# Patient Record
Sex: MALE | Race: BLACK OR AFRICAN AMERICAN | NOT HISPANIC OR LATINO | Employment: OTHER | URBAN - METROPOLITAN AREA
[De-identification: names, ages, dates, MRNs, and addresses within clinical notes are randomized per-mention and may not be internally consistent; named-entity substitution may affect disease eponyms.]

---

## 2024-06-13 ENCOUNTER — OFFICE VISIT (OUTPATIENT)
Dept: OPTOMETRY | Facility: CLINIC | Age: 64
End: 2024-06-13
Payer: COMMERCIAL

## 2024-06-13 DIAGNOSIS — H25.13 AGE-RELATED NUCLEAR CATARACT OF BOTH EYES: ICD-10-CM

## 2024-06-13 DIAGNOSIS — H11.003 PTERYGIUM OF BOTH EYES: Primary | ICD-10-CM

## 2024-06-13 PROCEDURE — 99203 OFFICE O/P NEW LOW 30 MIN: CPT | Performed by: OPTOMETRIST

## 2024-06-13 RX ORDER — NEOMYCIN SULFATE, POLYMYXIN B SULFATE AND DEXAMETHASONE 3.5; 10000; 1 MG/ML; [USP'U]/ML; MG/ML
SUSPENSION/ DROPS OPHTHALMIC
COMMUNITY
Start: 2024-04-29

## 2024-06-13 RX ORDER — DOXYCYCLINE HYCLATE 100 MG
TABLET ORAL
COMMUNITY
Start: 2024-05-25

## 2024-06-13 RX ORDER — AMLODIPINE BESYLATE 10 MG/1
1 TABLET ORAL DAILY
COMMUNITY
Start: 2023-09-05 | End: 2024-09-04

## 2024-06-13 ASSESSMENT — VISUAL ACUITY
METHOD: SNELLEN - LINEAR
OS_SC: 20/20
OS_SC+: -1
OD_SC+: -1
OD_SC: 20/25

## 2024-06-13 ASSESSMENT — EXTERNAL EXAM - LEFT EYE: OS_EXAM: NORMAL

## 2024-06-13 ASSESSMENT — EXTERNAL EXAM - RIGHT EYE: OD_EXAM: NORMAL

## 2024-06-13 ASSESSMENT — TONOMETRY
OD_IOP_MMHG: 14
OS_IOP_MMHG: 14
IOP_METHOD: TONOPEN

## 2024-06-13 ASSESSMENT — SLIT LAMP EXAM - LIDS
COMMENTS: NORMAL
COMMENTS: NORMAL

## 2024-06-13 NOTE — PATIENT INSTRUCTIONS
"A pterygium (qeh-SPW-ay-uhm) is an elevated, wedged-shaped bump on the eyeball that starts on the white of the eye (sclera) and can invade the cornea. If you have more than one of these eye growths, the plural form of the word is pterygia (mrr-LLY-wn-ah).    Though it's commonly called \"surfer's eye,\" you don't have to be a surfer or ever see the ocean to get a pterygium. But being in bright sunlight for long hours -- especially when you are on water, which reflects the sun's harmful UV rays -- increases your risk.    Pterygia are benign (non-cancerous) growths, but they can permanently disfigure the eye. They also can cause discomfort and blurry vision.    Causes  Although ultraviolet radiation from the sun appears to be the primary cause for the development and growth of pterygia, dust and wind are sometimes implicated too, as is dry eye disease.    Pterygia usually develop in 30- to 04-nmek-vkod, and these bumps on the eyeball rarely are seen in children. Having light skin and light eyes may put you at increased risk of getting a pterygium.    Signs And Symptoms  Pterygia usually occur on the side of the eye closer to the nose, but they can also develop on the side closer to the ear as well and can affect one eye or both eyes.    Many people with mild surfer's eye may not experience symptoms or require treatment. But large or growing pterygia often cause a gritty, itchy or burning sensation or the feeling something is \"in\" the eye (called a foreign body sensation). Also, these pterygia often become inflamed, causing unattractive red eyes.    If a pterygium significantly invades the cornea, it can distort the shape of the front surface of the eye, causing astigmatism and higher-order aberrations that affect vision.    Sometimes people confuse pterygia with eye growths called pingueculae, but they are different. Learn more about what a pinguecula is.    Pterygium Treatment  Treatment of surfer's eye depends on the " size of the pterygium, whether it is growing and the symptoms it causes. Regardless of severity, pterygia should be monitored to prevent scarring that could lead to vision loss.  Referred for corneal and cataract consult.    If a pterygium is small, your eye doctor may prescribe lubricants or a mild steroid eye drop to temporarily reduce swelling and redness. Contact lenses are sometimes used to cover the growth, protecting it from some of the effects of dryness or potentially from further UV exposure. Topical cyclosporine also may be prescribed for dry eye.    Even when there is no effect on vision, some people are bothered by the cosmetic appearance.  Surgery can warranted for both visual and cosmetic reasons.    If pterygium surgery is required, several surgical techniques are available. Your ophthalmologist who performs the procedure will determine the best technique for your specific needs.    Pterygium excision may be performed either in a room at the doctor's office or in an operating room. It's important to note that pterygium removal can induce astigmatism, especially in people who already have astigmatism.    Surgery for pterygium removal usually lasts no longer than 30 minutes, after which you likely will need to wear an eye patch for protection for a day or two. You should be able to return to work or normal activities the next day.    Recurrence  Unfortunately, pterygia often return after surgical removal, possibly due to oxidative stress and/or continued UV exposure.      You have the start of mild cataracts.  You may notice some blurred vision or glare with night driving.  It is important that you wear good sunglasses to protect your eyes from the ultraviolet light from the sun.  I recommend that you return in 1 year for an eye exam unless there are any sudden changes in your vision.       Winter Damian O.D.  [address]  [address]    [phone]

## 2024-06-13 NOTE — PROGRESS NOTES
"Chief Complaint   Patient presents with    Eye Problem Both Eyes     Pain in both eyes for the last few months. Was given Pred Forte for the last two weeks and it seems to help but when he stops using it, both eyes became red and very irritated. Eyes are painful, headaches, and redness.           Mouna Cortez - Optometric Assistant     See Review Of Systems       Medical, surgical and family histories reviewed and updated 6/13/2024.         OBJECTIVE: See Ophthalmology exam    ASSESSMENT:    ICD-10-CM    1. Pterygium of both eyes  H11.003       2. Age-related nuclear cataract of both eyes  H25.13          PLAN:    Patient Instructions   A pterygium (usv-YWS-cq-uhm) is an elevated, wedged-shaped bump on the eyeball that starts on the white of the eye (sclera) and can invade the cornea. If you have more than one of these eye growths, the plural form of the word is pterygia (mou-HAT-rn-ah).    Though it's commonly called \"surfer's eye,\" you don't have to be a surfer or ever see the ocean to get a pterygium. But being in bright sunlight for long hours -- especially when you are on water, which reflects the sun's harmful UV rays -- increases your risk.    Pterygia are benign (non-cancerous) growths, but they can permanently disfigure the eye. They also can cause discomfort and blurry vision.    Causes  Although ultraviolet radiation from the sun appears to be the primary cause for the development and growth of pterygia, dust and wind are sometimes implicated too, as is dry eye disease.    Pterygia usually develop in 30- to 92-[id number]-scit, and these bumps on the eyeball rarely are seen in children. Having light skin and light eyes may put you at increased risk of getting a pterygium.    Signs And Symptoms  Pterygia usually occur on the side of the eye closer to the nose, but they can also develop on the side closer to the ear as well and can affect one eye or both eyes.    Many people with mild surfer's eye may not " "experience symptoms or require treatment. But large or growing pterygia often cause a gritty, itchy or burning sensation or the feeling something is \"in\" the eye (called a foreign body sensation). Also, these pterygia often become inflamed, causing unattractive red eyes.    If a pterygium significantly invades the cornea, it can distort the shape of the front surface of the eye, causing astigmatism and higher-order aberrations that affect vision.    Sometimes people confuse pterygia with eye growths called pingueculae, but they are different. Learn more about what a pinguecula is.    Pterygium Treatment  Treatment of surfer's eye depends on the size of the pterygium, whether it is growing and the symptoms it causes. Regardless of severity, pterygia should be monitored to prevent scarring that could lead to vision loss.  Referred for corneal and cataract consult.    If a pterygium is small, your eye doctor may prescribe lubricants or a mild steroid eye drop to temporarily reduce swelling and redness. Contact lenses are sometimes used to cover the growth, protecting it from some of the effects of dryness or potentially from further UV exposure. Topical cyclosporine also may be prescribed for dry eye.    Even when there is no effect on vision, some people are bothered by the cosmetic appearance.  Surgery can warranted for both visual and cosmetic reasons.    If pterygium surgery is required, several surgical techniques are available. Your ophthalmologist who performs the procedure will determine the best technique for your specific needs.    Pterygium excision may be performed either in a room at the doctor's office or in an operating room. It's important to note that pterygium removal can induce astigmatism, especially in people who already have astigmatism.    Surgery for pterygium removal usually lasts no longer than 30 minutes, after which you likely will need to wear an eye patch for protection for a day or two. " You should be able to return to work or normal activities the next day.    Recurrence  Unfortunately, pterygia often return after surgical removal, possibly due to oxidative stress and/or continued UV exposure.      You have the start of mild cataracts.  You may notice some blurred vision or glare with night driving.  It is important that you wear good sunglasses to protect your eyes from the ultraviolet light from the sun.  I recommend that you return in 1 year for an eye exam unless there are any sudden changes in your vision.       Winter Damian O.D.  [address] [address]    [phone]

## 2024-06-13 NOTE — LETTER
"6/13/2024      Parris Manzano  [address] [facility] [address]      Dear Colleague,    Thank you for referring your patient, Parris Manzano, to the [facility]. Please see a copy of my visit note below.    Chief Complaint   Patient presents with     Eye Problem Both Eyes     Pain in both eyes for the last few months. Was given Pred Forte for the last two weeks and it seems to help but when he stops using it, both eyes became red and very irritated. Eyes are painful, headaches, and redness.           Mouna Cortez - Optometric Assistant     See Review Of Systems       Medical, surgical and family histories reviewed and updated 6/13/2024.         OBJECTIVE: See Ophthalmology exam    ASSESSMENT:    ICD-10-CM    1. Pterygium of both eyes  H11.003       2. Age-related nuclear cataract of both eyes  H25.13          PLAN:    Patient Instructions   A pterygium (qjo-AAI-qi-uhm) is an elevated, wedged-shaped bump on the eyeball that starts on the white of the eye (sclera) and can invade the cornea. If you have more than one of these eye growths, the plural form of the word is pterygia (bck-VYW-wh-ah).    Though it's commonly called \"surfer's eye,\" you don't have to be a surfer or ever see the ocean to get a pterygium. But being in bright sunlight for long hours -- especially when you are on water, which reflects the sun's harmful UV rays -- increases your risk.    Pterygia are benign (non-cancerous) growths, but they can permanently disfigure the eye. They also can cause discomfort and blurry vision.    Causes  Although ultraviolet radiation from the sun appears to be the primary cause for the development and growth of pterygia, dust and wind are sometimes implicated too, as is dry eye disease.    Pterygia usually develop in 30- to 83-[id number]-onyw, and these bumps on the eyeball rarely are seen in children. Having light skin and light eyes may put you at increased risk of getting a " "pterygium.    Signs And Symptoms  Pterygia usually occur on the side of the eye closer to the nose, but they can also develop on the side closer to the ear as well and can affect one eye or both eyes.    Many people with mild surfer's eye may not experience symptoms or require treatment. But large or growing pterygia often cause a gritty, itchy or burning sensation or the feeling something is \"in\" the eye (called a foreign body sensation). Also, these pterygia often become inflamed, causing unattractive red eyes.    If a pterygium significantly invades the cornea, it can distort the shape of the front surface of the eye, causing astigmatism and higher-order aberrations that affect vision.    Sometimes people confuse pterygia with eye growths called pingueculae, but they are different. Learn more about what a pinguecula is.    Pterygium Treatment  Treatment of surfer's eye depends on the size of the pterygium, whether it is growing and the symptoms it causes. Regardless of severity, pterygia should be monitored to prevent scarring that could lead to vision loss.  Referred for corneal and cataract consult.    If a pterygium is small, your eye doctor may prescribe lubricants or a mild steroid eye drop to temporarily reduce swelling and redness. Contact lenses are sometimes used to cover the growth, protecting it from some of the effects of dryness or potentially from further UV exposure. Topical cyclosporine also may be prescribed for dry eye.    Even when there is no effect on vision, some people are bothered by the cosmetic appearance.  Surgery can warranted for both visual and cosmetic reasons.    If pterygium surgery is required, several surgical techniques are available. Your ophthalmologist who performs the procedure will determine the best technique for your specific needs.    Pterygium excision may be performed either in a room at the doctor's office or in an operating room. It's important to note that " pterygium removal can induce astigmatism, especially in people who already have astigmatism.    Surgery for pterygium removal usually lasts no longer than 30 minutes, after which you likely will need to wear an eye patch for protection for a day or two. You should be able to return to work or normal activities the next day.    Recurrence  Unfortunately, pterygia often return after surgical removal, possibly due to oxidative stress and/or continued UV exposure.      You have the start of mild cataracts.  You may notice some blurred vision or glare with night driving.  It is important that you wear good sunglasses to protect your eyes from the ultraviolet light from the sun.  I recommend that you return in 1 year for an eye exam unless there are any sudden changes in your vision.       Winter Damian O.D.  [address] [id number] [phone]               Again, thank you for allowing me to participate in the care of your patient.        Sincerely,        Winter Damian OD

## 2024-07-30 ENCOUNTER — OFFICE VISIT (OUTPATIENT)
Dept: OPHTHALMOLOGY | Facility: CLINIC | Age: 64
End: 2024-07-30
Attending: OPTOMETRIST
Payer: COMMERCIAL

## 2024-07-30 DIAGNOSIS — H04.123 DRY EYE SYNDROME, BILATERAL: ICD-10-CM

## 2024-07-30 DIAGNOSIS — H11.153 PINGUECULA OF BOTH EYES: ICD-10-CM

## 2024-07-30 DIAGNOSIS — H11.002 PTERYGIUM OF LEFT EYE: Primary | ICD-10-CM

## 2024-07-30 DIAGNOSIS — H25.13 NUCLEAR SCLEROTIC CATARACT OF BOTH EYES: ICD-10-CM

## 2024-07-30 PROCEDURE — 92002 INTRM OPH EXAM NEW PATIENT: CPT | Performed by: STUDENT IN AN ORGANIZED HEALTH CARE EDUCATION/TRAINING PROGRAM

## 2024-07-30 RX ORDER — FLUOROMETHOLONE 0.1 %
1 SUSPENSION, DROPS(FINAL DOSAGE FORM)(ML) OPHTHALMIC (EYE) 4 TIMES DAILY
Qty: 5 ML | Refills: 2 | Status: SHIPPED | OUTPATIENT
Start: 2024-07-30

## 2024-07-30 RX ORDER — CARBOXYMETHYLCELLULOSE SODIUM 10 MG/ML
1 GEL OPHTHALMIC 3 TIMES DAILY
Qty: 15 ML | Refills: 6 | Status: SHIPPED | OUTPATIENT
Start: 2024-07-30

## 2024-07-30 ASSESSMENT — CONF VISUAL FIELD
OD_SUPERIOR_TEMPORAL_RESTRICTION: 0
OS_NORMAL: 1
OS_SUPERIOR_NASAL_RESTRICTION: 0
OS_INFERIOR_TEMPORAL_RESTRICTION: 0
OS_INFERIOR_NASAL_RESTRICTION: 0
OD_SUPERIOR_NASAL_RESTRICTION: 0
OD_INFERIOR_TEMPORAL_RESTRICTION: 0
OS_SUPERIOR_TEMPORAL_RESTRICTION: 0
OD_INFERIOR_NASAL_RESTRICTION: 0
OD_NORMAL: 1

## 2024-07-30 ASSESSMENT — TONOMETRY
OS_IOP_MMHG: 19
IOP_METHOD: APPLANATION
OD_IOP_MMHG: 19

## 2024-07-30 ASSESSMENT — REFRACTION_MANIFEST
OS_CYLINDER: +0.75
OS_AXIS: 178
OS_SPHERE: -0.50
OD_AXIS: 015
OD_CYLINDER: +0.25
OD_SPHERE: -0.25
OD_ADD: +2.50
OS_ADD: +2.50

## 2024-07-30 ASSESSMENT — VISUAL ACUITY
OD_SC: 20/40
METHOD: SNELLEN - LINEAR
OD_CC: 20/25
OD_CC+: -2
OS_SC: 20/20
OS_CC: 20/20
OS_CC+: -1

## 2024-07-30 ASSESSMENT — SLIT LAMP EXAM - LIDS
COMMENTS: NORMAL
COMMENTS: NORMAL

## 2024-07-30 ASSESSMENT — REFRACTION_WEARINGRX
OD_AXIS: 005
OS_SPHERE: PLANO
SPECS_TYPE: BIFOCAL
OS_ADD: +2.50
OS_CYLINDER: +1.00
OS_AXIS: 177
OD_CYLINDER: +1.75
OD_ADD: +2.50
OD_SPHERE: -1.00

## 2024-07-30 ASSESSMENT — EXTERNAL EXAM - LEFT EYE: OS_EXAM: NORMAL

## 2024-07-30 ASSESSMENT — CUP TO DISC RATIO
OD_RATIO: 0.4
OS_RATIO: 0.4

## 2024-07-30 ASSESSMENT — EXTERNAL EXAM - RIGHT EYE: OD_EXAM: NORMAL

## 2024-07-30 NOTE — LETTER
7/30/2024      Parris Manzano  [address] [facility] [address]      Dear Dr. Vivienne Damian,    Thank you for referring your patient, Parris Manzano, to the [facility]. Please see a copy of my visit note below.     Current Eye Medications: Prednisolone up to four times daily       Subjective:  Here for evaluation of pterygium and cataracts. Patient complains of eyes feel very dry and irritated. Left eye feels worse today. Also eyes have been red. Symptoms started about 6 months ago. Prednisolone helps to relieve irritation and redness. Patient would like to have eyes feeling better before considering cataract surgery. Patient does not wear glasses often because they give him headache and causes eyes to feel like there is needles in them after about 1 hour of wearing. Overall vision is slightly blurry. Vision in right eye is worse than left eye. Blurry vision started 6 months ago and has been gradually worsening.     He has seen some optometrists in the past 6 few months. One of them, Dr. Jignehs Barone, prescribed prednisolone, and the patient has been consistently using this drop up to four times a day for the past few months. He has also been told that he needs pterygium and cataract surgery by the optometrists that he has seen recently.      Objective:  See Ophthalmology Exam.       Assessment:  Parris Manzano is a 63 year old male who presents with:   Encounter Diagnoses   Name Primary?     Pterygium of left eye Small, mildly elevated, not visually significant pterygium left eye. Discussed side effects of long-term steroid use with prednisolone drops. Will switch to FML twice a day and have him lubricate with gel tears.  No indications for surgery at present.      Pinguecula of both eyes      Nuclear sclerotic cataract of both eyes No cataract surgery needed at present. May update glasses.     Dry eye syndrome, bilateral        Plan:  Try FML eyedrops twice a day and gel tears twice a  day and at bedtime (like Refresh Liquigel or GenTeal Gel Tears)  instead of prednisolone eyedrops twice a day in both eyes.    Use a gel tear at bedtime in both eyes     Keeley Austin MD  [phone]        Again, thank you for allowing me to participate in the care of your patient.        Sincerely,        Keeley Austin MD

## 2024-07-30 NOTE — PATIENT INSTRUCTIONS
Try FML eyedrops twice a day and gel tears twice a day and at bedtime (like Refresh Liquigel or GenTeal Gel Tears)  instead of prednisolone eyedrops twice a day in both eyes.    Use a gel tear at bedtime in both eyes     Keeley Austin MD  [phone]

## 2024-07-30 NOTE — PROGRESS NOTES
Current Eye Medications: Prednisolone up to four times daily       Subjective:  Here for evaluation of pterygium and cataracts. Patient complains of eyes feel very dry and irritated. Left eye feels worse today. Also eyes have been red. Symptoms started about 6 months ago. Prednisolone helps to relieve irritation and redness. Patient would like to have eyes feeling better before considering cataract surgery. Patient does not wear glasses often because they give him headache and causes eyes to feel like there is needles in them after about 1 hour of wearing. Overall vision is slightly blurry. Vision in right eye is worse than left eye. Blurry vision started 6 months ago and has been gradually worsening.     He has seen some optometrists in the past 6 few months. One of them, Dr. Jignesh Barone, prescribed prednisolone, and the patient has been consistently using this drop up to four times a day for the past few months. He has also been told that he needs pterygium and cataract surgery by the optometrists that he has seen recently.      Objective:  See Ophthalmology Exam.       Assessment:  Parris Manzano is a 63 year old male who presents with:   Encounter Diagnoses   Name Primary?    Pterygium of left eye Small, mildly elevated, not visually significant pterygium left eye. Discussed side effects of long-term steroid use with prednisolone drops. Will switch to FML twice a day and have him lubricate with gel tears.  No indications for surgery at present.     Pinguecula of both eyes     Nuclear sclerotic cataract of both eyes No cataract surgery needed at present. May update glasses.    Dry eye syndrome, bilateral        Plan:  Try FML eyedrops twice a day and gel tears twice a day and at bedtime (like Refresh Liquigel or GenTeal Gel Tears)  instead of prednisolone eyedrops twice a day in both eyes.    Use a gel tear at bedtime in both eyes     Keeley Austin MD  [phone]

## 2024-10-29 ENCOUNTER — OFFICE VISIT (OUTPATIENT)
Dept: OPHTHALMOLOGY | Facility: CLINIC | Age: 64
End: 2024-10-29
Payer: COMMERCIAL

## 2024-10-29 DIAGNOSIS — H11.002 PTERYGIUM OF LEFT EYE: Primary | ICD-10-CM

## 2024-10-29 DIAGNOSIS — H04.123 DRY EYE SYNDROME, BILATERAL: ICD-10-CM

## 2024-10-29 DIAGNOSIS — H11.153 PINGUECULA OF BOTH EYES: ICD-10-CM

## 2024-10-29 DIAGNOSIS — H25.13 NUCLEAR SCLEROTIC CATARACT OF BOTH EYES: ICD-10-CM

## 2024-10-29 PROCEDURE — 92012 INTRM OPH EXAM EST PATIENT: CPT | Performed by: STUDENT IN AN ORGANIZED HEALTH CARE EDUCATION/TRAINING PROGRAM

## 2024-10-29 RX ORDER — CARBOXYMETHYLCELLULOSE SODIUM 10 MG/ML
1 GEL OPHTHALMIC 3 TIMES DAILY
Qty: 15 ML | Refills: 6 | Status: SHIPPED | OUTPATIENT
Start: 2024-10-29

## 2024-10-29 ASSESSMENT — SLIT LAMP EXAM - LIDS
COMMENTS: NORMAL
COMMENTS: NORMAL

## 2024-10-29 ASSESSMENT — VISUAL ACUITY
OD_CC: 20/30
METHOD: SNELLEN - LINEAR
CORRECTION_TYPE: GLASSES
OD_SC+: -1
OD_PH_SC: 20/25
OD_SC: 20/40
OS_SC: 20/20
OS_SC+: -1
OD_PH_SC+: -1

## 2024-10-29 ASSESSMENT — EXTERNAL EXAM - RIGHT EYE: OD_EXAM: NORMAL

## 2024-10-29 ASSESSMENT — REFRACTION_WEARINGRX
OD_ADD: +2.50
OS_CYLINDER: +1.00
OD_AXIS: 005
OD_CYLINDER: +1.75
OD_SPHERE: -1.00
SPECS_TYPE: BIFOCAL
OS_ADD: +2.50
OS_AXIS: 177
OS_SPHERE: PLANO

## 2024-10-29 ASSESSMENT — EXTERNAL EXAM - LEFT EYE: OS_EXAM: NORMAL

## 2024-10-29 ASSESSMENT — TONOMETRY
IOP_METHOD: APPLANATION
OD_IOP_MMHG: 15
OS_IOP_MMHG: 14

## 2024-10-29 NOTE — PROGRESS NOTES
Current Eye Medications:  FML eyedrops twice a day and gel tears twice a day both eyes      Subjective:  here for IOP check today. When working on the computer five hours a day he has some irritation.  Eyes are still red in the corners, but not as bad as before. No itching or pain today. He does currently have a cold. Also cannot order new glasses yet as he just got these 4-6 months ago.      Objective:  See Ophthalmology Exam.       Assessment:  Parris Manzano is a 64 year old male who presents with:   Encounter Diagnoses   Name Primary?    Pterygium of left eye Yes    Pinguecula of both eyes     Nuclear sclerotic cataract of both eyes     Dry eye syndrome, bilateral        Plan:  Decrease FML to once daily and continue gel tears twice daily (ok to use gel tears up to four times a day)    Keeley Austin MD  [phone]

## 2024-10-29 NOTE — PATIENT INSTRUCTIONS
Decrease FML to once daily and continue gel tears twice daily (ok to use gel tears up to four times a day)    Keeley Austin MD  [phone]

## 2024-10-29 NOTE — LETTER
10/29/2024      Parris Manzano  [address] [facility] [address]      Dear Colleague,    Thank you for referring your patient, Parris Manzano, to the [facility]. Please see a copy of my visit note below.     Current Eye Medications:  FML eyedrops twice a day and gel tears twice a day both eyes      Subjective:  here for IOP check today. When working on the computer five hours a day he has some irritation.  Eyes are still red in the corners, but not as bad as before. No itching or pain today. He does currently have a cold. Also cannot order new glasses yet as he just got these 4-6 months ago.      Objective:  See Ophthalmology Exam.       Assessment:  Parris Manzano is a 64 year old male who presents with:   Encounter Diagnoses   Name Primary?     Pterygium of left eye Yes     Pinguecula of both eyes      Nuclear sclerotic cataract of both eyes      Dry eye syndrome, bilateral        Plan:  Decrease FML to once daily and continue gel tears twice daily (ok to use gel tears up to four times a day)    Keeley Austin MD  [phone]     Again, thank you for allowing me to participate in the care of your patient.        Sincerely,        Keeley Austin MD

## 2024-11-20 DIAGNOSIS — H11.153 PINGUECULA OF BOTH EYES: ICD-10-CM

## 2024-11-20 DIAGNOSIS — H11.002 PTERYGIUM OF LEFT EYE: Primary | ICD-10-CM

## 2024-11-20 RX ORDER — FLUOROMETHOLONE 1 MG/ML
1 SUSPENSION/ DROPS OPHTHALMIC DAILY
Qty: 5 ML | Refills: 3 | Status: SHIPPED | OUTPATIENT
Start: 2024-11-20

## 2024-11-20 NOTE — TELEPHONE ENCOUNTER
Refill requested for FML drops.  Patient is to be taking once daily both eyes. Will send script to Dr. Austin to approve.

## 2025-07-24 ENCOUNTER — OFFICE VISIT (OUTPATIENT)
Dept: OPHTHALMOLOGY | Facility: CLINIC | Age: 65
End: 2025-07-24
Payer: COMMERCIAL

## 2025-07-24 DIAGNOSIS — H25.13 NUCLEAR SCLEROTIC CATARACT OF BOTH EYES: ICD-10-CM

## 2025-07-24 DIAGNOSIS — H52.203 MYOPIA OF BOTH EYES WITH ASTIGMATISM AND PRESBYOPIA: ICD-10-CM

## 2025-07-24 DIAGNOSIS — H52.13 MYOPIA OF BOTH EYES WITH ASTIGMATISM AND PRESBYOPIA: ICD-10-CM

## 2025-07-24 DIAGNOSIS — H11.002 PTERYGIUM OF LEFT EYE: ICD-10-CM

## 2025-07-24 DIAGNOSIS — Z01.00 EXAMINATION OF EYES AND VISION: Primary | ICD-10-CM

## 2025-07-24 DIAGNOSIS — H11.153 PINGUECULA OF BOTH EYES: ICD-10-CM

## 2025-07-24 DIAGNOSIS — H52.4 MYOPIA OF BOTH EYES WITH ASTIGMATISM AND PRESBYOPIA: ICD-10-CM

## 2025-07-24 DIAGNOSIS — H04.123 DRY EYE SYNDROME, BILATERAL: ICD-10-CM

## 2025-07-24 PROBLEM — I10 ESSENTIAL HYPERTENSION: Status: ACTIVE | Noted: 2018-04-19

## 2025-07-24 PROBLEM — N40.0 BENIGN PROSTATIC HYPERPLASIA: Status: ACTIVE | Noted: 2022-04-13

## 2025-07-24 RX ORDER — ERGOCALCIFEROL 1.25 MG/1
50000 CAPSULE, LIQUID FILLED ORAL WEEKLY
COMMUNITY
Start: 2024-10-28

## 2025-07-24 RX ORDER — TAMSULOSIN HYDROCHLORIDE 0.4 MG/1
1 CAPSULE ORAL DAILY
COMMUNITY
Start: 2024-09-17

## 2025-07-24 ASSESSMENT — SLIT LAMP EXAM - LIDS
COMMENTS: NORMAL
COMMENTS: NORMAL

## 2025-07-24 ASSESSMENT — CONF VISUAL FIELD
OD_SUPERIOR_NASAL_RESTRICTION: 0
OS_SUPERIOR_TEMPORAL_RESTRICTION: 0
OD_INFERIOR_NASAL_RESTRICTION: 0
OS_INFERIOR_TEMPORAL_RESTRICTION: 0
OD_NORMAL: 1
OD_INFERIOR_TEMPORAL_RESTRICTION: 0
OD_SUPERIOR_TEMPORAL_RESTRICTION: 0
OS_NORMAL: 1
OS_INFERIOR_NASAL_RESTRICTION: 0
OS_SUPERIOR_NASAL_RESTRICTION: 0

## 2025-07-24 ASSESSMENT — REFRACTION_MANIFEST
OD_ADD: +2.75
OD_AXIS: 017
OS_ADD: +2.75
OS_AXIS: 175
OD_SPHERE: -0.75
OS_CYLINDER: +1.25
OD_CYLINDER: +1.50
OS_SPHERE: PLANO

## 2025-07-24 ASSESSMENT — VISUAL ACUITY
OD_CC: 20/25
OS_CC: 20/20
OS_CC+: -1
METHOD: SNELLEN - LINEAR
OD_CC+: -1

## 2025-07-24 ASSESSMENT — REFRACTION_WEARINGRX
OS_ADD: +2.50
OD_AXIS: 005
OD_ADD: +2.50
OS_CYLINDER: +1.00
OS_AXIS: 177
OD_CYLINDER: +1.75
OD_SPHERE: -1.00
OS_SPHERE: PLANO
SPECS_TYPE: BIFOCAL-LINED

## 2025-07-24 ASSESSMENT — CUP TO DISC RATIO
OD_RATIO: 0.4
OS_RATIO: 0.4

## 2025-07-24 ASSESSMENT — EXTERNAL EXAM - RIGHT EYE: OD_EXAM: NORMAL

## 2025-07-24 ASSESSMENT — TONOMETRY
IOP_METHOD: APPLANATION
OS_IOP_MMHG: 16
OD_IOP_MMHG: 16

## 2025-07-24 ASSESSMENT — EXTERNAL EXAM - LEFT EYE: OS_EXAM: NORMAL

## 2025-07-24 NOTE — PROGRESS NOTES
" Current Eye Medications:  Refresh prn both eyes     Subjective:  here for complete eye exam today. Eyes are not irritated or bothering him anymore. Would like trifocal Rx today as he is having neck pain from trying to look at the screen.     Objective:  See Ophthalmology Exam.       Assessment:  Parris Manzano is a 64 year old male who presents with:   Encounter Diagnoses   Name Primary?    Examination of eyes and vision Yes    Pterygium of left eye     Pinguecula of both eyes     Nuclear sclerotic cataract of both eyes     Dry eye syndrome, bilateral     Myopia of both eyes with astigmatism and presbyopia        Plan:  Glasses prescription given     Continue artificial tears up to four times a day as needed (Refresh Plus or Refresh Optive or TheraTears. Avoid generic artificial tears or \"get the red out\" drops).      Keeley Austin MD  [phone]      "

## 2025-07-24 NOTE — PATIENT INSTRUCTIONS
"Glasses prescription given     Continue artificial tears up to four times a day as needed (Refresh Plus or Refresh Optive or TheraTears. Avoid generic artificial tears or \"get the red out\" drops).      Keeley Austin MD  [phone]    "

## 2025-07-24 NOTE — LETTER
"7/24/2025      Parris Manzano  [address] [facility] [address]      Dear Colleague,    Thank you for referring your patient, Parris Manzano, to the [facility]. Please see a copy of my visit note below.     Current Eye Medications:  Refresh prn both eyes     Subjective:  here for complete eye exam today. Eyes are not irritated or bothering him anymore. Would like trifocal Rx today as he is having neck pain from trying to look at the screen.     Objective:  See Ophthalmology Exam.       Assessment:  Parris Manzano is a 64 year old male who presents with:   Encounter Diagnoses   Name Primary?     Examination of eyes and vision Yes     Pterygium of left eye      Pinguecula of both eyes      Nuclear sclerotic cataract of both eyes      Dry eye syndrome, bilateral      Myopia of both eyes with astigmatism and presbyopia        Plan:  Glasses prescription given     Continue artificial tears up to four times a day as needed (Refresh Plus or Refresh Optive or TheraTears. Avoid generic artificial tears or \"get the red out\" drops).      Keeley Austin MD  [phone]        Again, thank you for allowing me to participate in the care of your patient.        Sincerely,        Keeley Austin MD    Electronically signed"